# Patient Record
Sex: FEMALE | Race: OTHER | NOT HISPANIC OR LATINO | URBAN - METROPOLITAN AREA
[De-identification: names, ages, dates, MRNs, and addresses within clinical notes are randomized per-mention and may not be internally consistent; named-entity substitution may affect disease eponyms.]

---

## 2020-11-06 ENCOUNTER — EMERGENCY (EMERGENCY)
Facility: HOSPITAL | Age: 25
LOS: 1 days | Discharge: HOME | End: 2020-11-06
Attending: EMERGENCY MEDICINE | Admitting: EMERGENCY MEDICINE
Payer: COMMERCIAL

## 2020-11-06 VITALS
SYSTOLIC BLOOD PRESSURE: 107 MMHG | RESPIRATION RATE: 18 BRPM | TEMPERATURE: 98 F | HEART RATE: 71 BPM | WEIGHT: 110.01 LBS | OXYGEN SATURATION: 98 % | DIASTOLIC BLOOD PRESSURE: 70 MMHG

## 2020-11-06 DIAGNOSIS — Z20.828 CONTACT WITH AND (SUSPECTED) EXPOSURE TO OTHER VIRAL COMMUNICABLE DISEASES: ICD-10-CM

## 2020-11-06 LAB — SARS-COV-2 RNA SPEC QL NAA+PROBE: SIGNIFICANT CHANGE UP

## 2020-11-06 PROCEDURE — 99283 EMERGENCY DEPT VISIT LOW MDM: CPT

## 2020-11-06 PROCEDURE — U0003: CPT

## 2020-11-06 NOTE — ED PROVIDER NOTE - CLINICAL SUMMARY MEDICAL DECISION MAKING FREE TEXT BOX
24 healthy F presents requesting covid test as she is traveling to [place] tomorrow and a negative test is required for flight.  pt asymptomatic, feeling well without any complaints.  pt instructed to quarantine if she experiences any covid symptoms

## 2020-11-06 NOTE — ED PROVIDER NOTE - NSFOLLOWUPINSTRUCTIONS_ED_ALL_ED_FT
You can call ED later today or tomorrow for covid results.    If you start to experience any fever, cough, congestion, sore throat, diarrhea or other covid symptoms please quarantine at home.      Return to ED if you experience difficulty breathing, chest pain, dizziness, fainting or other concerns     Follow up with primary care doctor in one week.

## 2020-11-06 NOTE — ED ADULT NURSE NOTE - OBJECTIVE STATEMENT
25 y/o female presents to ED states she is traveling tomorrow, requesting a covid test. Denies symptoms or known exposures.

## 2020-11-06 NOTE — ED PROVIDER NOTE - OBJECTIVE STATEMENT
24 healthy F presents requesting covid test as she is traveling to [place] tomorrow and a negative test is required for flight.  Pt feeling well, no covid symptoms or known covid contacts.  Denies f/c, headache, cough, uri sxs, chest pain, sob, abd pain, nvd, sick contacts, recent travel.

## 2020-11-06 NOTE — ED PROVIDER NOTE - PHYSICAL EXAMINATION
Vitals reviewed  Gen: well appearing, nad, speaking in full sentences  Skin: wwp, no rash/lesions  HEENT: ncat, eomi, mmm  CV: rrr, no audible m/r/g  Resp: symmetrical expansion, ctab, no w/r/r  Abd: nondistended, soft/nt  Ext: FROM throughout, no peripheral edema  Neuro: alert/oriented, no focal deficits, steady gait

## 2020-11-06 NOTE — ED PROVIDER NOTE - ATTENDING CONTRIBUTION TO CARE
25 yo f w no pmhx  presents to ED w concern for COVID testing to travel tomorrow.  Asymptomatic.  Well appearing.  Will swab and discharge.

## 2020-11-06 NOTE — ED PROVIDER NOTE - PATIENT PORTAL LINK FT
You can access the FollowMyHealth Patient Portal offered by [facility] by registering at the following website: [email or web address]. By joining [employer]’s FollowMyHealth portal, you will also be able to view your health information using other applications (apps) compatible with our system.